# Patient Record
Sex: MALE | Race: WHITE | Employment: FULL TIME | URBAN - METROPOLITAN AREA
[De-identification: names, ages, dates, MRNs, and addresses within clinical notes are randomized per-mention and may not be internally consistent; named-entity substitution may affect disease eponyms.]

---

## 2019-10-28 ENCOUNTER — APPOINTMENT (OUTPATIENT)
Dept: GENERAL RADIOLOGY | Facility: HOSPITAL | Age: 49
End: 2019-10-28
Attending: EMERGENCY MEDICINE
Payer: COMMERCIAL

## 2019-10-28 ENCOUNTER — HOSPITAL ENCOUNTER (EMERGENCY)
Facility: HOSPITAL | Age: 49
Discharge: HOME | End: 2019-10-28
Attending: EMERGENCY MEDICINE
Payer: COMMERCIAL

## 2019-10-28 VITALS
SYSTOLIC BLOOD PRESSURE: 130 MMHG | OXYGEN SATURATION: 95 % | WEIGHT: 210 LBS | DIASTOLIC BLOOD PRESSURE: 88 MMHG | TEMPERATURE: 98 F | HEART RATE: 75 BPM | RESPIRATION RATE: 18 BRPM | BODY MASS INDEX: 26.11 KG/M2 | HEIGHT: 75 IN

## 2019-10-28 DIAGNOSIS — S93.601A SPRAIN OF RIGHT FOOT, INITIAL ENCOUNTER: ICD-10-CM

## 2019-10-28 DIAGNOSIS — S93.602A SPRAIN OF LEFT FOOT, INITIAL ENCOUNTER: Primary | ICD-10-CM

## 2019-10-28 PROCEDURE — 73630 X-RAY EXAM OF FOOT: CPT | Performed by: EMERGENCY MEDICINE

## 2019-10-28 PROCEDURE — 99283 EMERGENCY DEPT VISIT LOW MDM: CPT

## 2019-10-28 RX ORDER — HYDROCODONE BITARTRATE AND ACETAMINOPHEN 5; 325 MG/1; MG/1
1-2 TABLET ORAL EVERY 6 HOURS PRN
Qty: 20 TABLET | Refills: 0 | Status: SHIPPED | OUTPATIENT
Start: 2019-10-28 | End: 2019-11-04

## 2019-10-28 NOTE — ED PROVIDER NOTES
Patient Seen in: [facility] Emergency Department      History   Patient presents with:  Lower Extremity Injury (musculoskeletal)    Stated Complaint: bilateral foot pain after jumping and landing hard.      HPI    24-year-old male presents emergency Supple no JVD no lymphadenopathy no meningismus no carotid bruit  CV: Regular rate and rhythm no murmur rub  Respiratory: Clear to auscultation bilaterally no crackles no wheezes no accessory muscle use  Abdomen: Soft nontender nondistended, no rebound no No significant bony abnormality. IMPRESSION: Unremarkable radiographs of the right foot. Dictated by: Anu Ndiaye MD on 10/28/2019 at 13:47     Approved by: Anu Ndiaye MD on 10/28/2019 at 13:48            X-rays unremarkable.   Patient will

## (undated) NOTE — ED AVS SNAPSHOT
Rony Crimes   MRN: [id number]    Department:  [facility] Emergency Department   Date of Visit:  10/28/2019           Disclosure     Insurance plans vary and the physician(s) referred by the ER may not be covered by your plan.  Please contact your tell this physician (or your personal doctor if your instructions are to return to your personal doctor) about any new or lasting problems. The primary care or specialist physician will see patients referred from the [facility] Emergency Department.  Remberto Gaming